# Patient Record
Sex: FEMALE | Race: WHITE | NOT HISPANIC OR LATINO | Employment: UNEMPLOYED | URBAN - METROPOLITAN AREA
[De-identification: names, ages, dates, MRNs, and addresses within clinical notes are randomized per-mention and may not be internally consistent; named-entity substitution may affect disease eponyms.]

---

## 2024-06-07 ENCOUNTER — DOCUMENTATION ONLY (OUTPATIENT)
Dept: CARDIOTHORACIC SURGERY | Facility: CLINIC | Age: 64
End: 2024-06-07

## 2024-06-07 NOTE — PROGRESS NOTES
Called Dr Huff's nurse, Mamie, and let her know that Dr Michaels is leaving [facility] and is not taking new patients. Asked if there was another provider that they would like to refer to. The is no other specific provider requested.    Julie Haase RN  [facility] Nurse Navigator [phone]

## 2024-06-17 ENCOUNTER — TELEPHONE (OUTPATIENT)
Dept: CARDIOTHORACIC SURGERY | Facility: CLINIC | Age: 64
End: 2024-06-17

## 2024-06-17 NOTE — TELEPHONE ENCOUNTER
Called to let pt know Dr Michaels was not taking new patients. Left voice mail with call back number.    Julie Haase RN  [facility] Nurse Navigator [phone]     no

## 2024-07-09 ENCOUNTER — PATIENT MESSAGE (OUTPATIENT)
Dept: OTOLARYNGOLOGY | Facility: CLINIC | Age: 64
End: 2024-07-09

## 2024-07-12 ENCOUNTER — TELEPHONE (OUTPATIENT)
Dept: NEUROSURGERY | Facility: CLINIC | Age: 64
End: 2024-07-12

## 2024-07-12 NOTE — TELEPHONE ENCOUNTER
Called patient in regards to referral placed to Neurosurgery/Jairo Giraldo MD. No updated imaging in chart. No answer. LVM to call [phone] for appointment scheduling

## 2024-07-12 NOTE — TELEPHONE ENCOUNTER
Called patient in regards to referral placed to Neurosurgery/Jairo Giraldo MD. No updated imaging in chart. No answer. LVM to call [phone] for appointment scheduling  
+fentanyl 2mch/ml; 14cc/hr; Ropivacaine 0.25% 8cc/Continuous Bupivicaine 0.1 percent

## 2024-07-29 PROBLEM — I10 HTN (HYPERTENSION): Status: ACTIVE | Noted: 2024-07-29

## 2024-07-29 PROBLEM — E66.01 SEVERE OBESITY (BMI >= 40): Status: ACTIVE | Noted: 2024-07-29

## 2024-07-29 PROBLEM — E78.5 HLD (HYPERLIPIDEMIA): Status: ACTIVE | Noted: 2024-07-29

## 2024-07-29 PROBLEM — I71.010 ASCENDING AORTIC DISSECTION: Status: ACTIVE | Noted: 2024-07-29

## 2024-08-22 ENCOUNTER — TELEPHONE (OUTPATIENT)
Dept: CARDIOTHORACIC SURGERY | Facility: CLINIC | Age: 64
End: 2024-08-22
Payer: MEDICARE

## 2024-08-22 NOTE — TELEPHONE ENCOUNTER
Called pt at both phone numbers listed for her; no answer.  Left VM at both phone numbers, requesting a call back to reschedule previously cancelled appts.      ----- Message from Bebeto Hahn sent at 8/22/2024  9:13 AM CDT -----  Good morning,     The appointment on 08/05/24 was canceled. Please contact the patient to reschedule.     Thank You,     [facility] Hahn  [facility]

## 2024-08-23 ENCOUNTER — TELEPHONE (OUTPATIENT)
Dept: CARDIOTHORACIC SURGERY | Facility: CLINIC | Age: 64
End: 2024-08-23
Payer: MEDICARE

## 2024-08-23 NOTE — TELEPHONE ENCOUNTER
Called and scheduled consult with Dr. Ellington for TAA on 9/23.  Pt informed that she will also be scheduled with Vascular Surgery on the same day for her thoracoabdominal aneurysm, which she verbalized understanding to.  Pt informed that an appt slip for 9/23 will be mailed to her, which she verbalized understanding to.

## 2024-09-20 ENCOUNTER — TELEPHONE (OUTPATIENT)
Dept: CARDIOTHORACIC SURGERY | Facility: CLINIC | Age: 64
End: 2024-09-20
Payer: MEDICARE

## 2024-09-20 NOTE — TELEPHONE ENCOUNTER
Attempted to contact pt to confirm 9/23 appt with Dr. Ellington. LVM with appt date, time, and location along with contact number.

## 2024-09-20 NOTE — PROGRESS NOTES
"Subjective:      Patient ID: Chelsie Odom is a 64 y.o. female.    Chief Complaint: No chief complaint on file.      HPI:  Chelsie Odom is a 64 y.o. female who presents for surgical evaluation of chronic dissection s/p Type A repair in 2004 with Dr. Almeida at [facility]. Medical conditions include hypertension, hyperlipidemia, chronic fatigue, obesity, Adverse anesthesia outcome (Daily memory function was affected for 6 months after anestheia 3/2015), PVD.  Patient had CTA chest in May that read "extensive aortic dissection which extends from the aortic annulus to the proximal bilateral common iliac arteries. The thoracic portion is stable when compared to the prior chest CT. No available images for comparison of the abdominal aorta component." Patient denies any family history of aneurysm. Reports that during her original operation it was Labor Day weekend and she collapsed in the yard. Reports it took some time for the doctors to figure out what was wrong with her. Says that she is not interested in having another operation that requires her to be cut open from her chest to her abdomen but reports leaving it alone probably is not a good idea either. Is very frustrated that she has been tossed around by so many physicians. Reports that for the past couple months she has had increasing fatigue and shortness of breath. It is hard for her to even carry her small purse now without having back pain. Bought a cane to help stabilize her back and uses it every now and then. Denies chest pain, dizziness, syncope, lower extremity swelling. Regarding her smoking she starts and stops frequently. Currently smoking. No prior strokes, seizures, blood clots, stents.       Current Outpatient Medications   Medication Instructions    aspirin (ECOTRIN) 81 mg, Oral, Daily    atenoloL (TENORMIN) 50 mg, Oral, Daily    diphenhydrAMINE (BENADRYL ALLERGY) 25 mg tablet Oral, Nightly PRN    etodolac (LODINE) 500 mg, Oral, 2 times " daily with meals    hydroCHLOROthiazide (HYDRODIURIL) 12.5 mg, Oral, Daily    HYDROcodone-acetaminophen (NORCO) 7.5-325 mg per tablet 1 tablet, Oral, Every 6 hours PRN    losartan (COZAAR) 50 mg, Oral, Daily    meloxicam (MOBIC) 15 mg, Oral, Daily, With food    multivitamin with minerals tablet 1 tablet, Oral, Daily    nebivoloL (BYSTOLIC) 5 MG Tab 1 tablet, Oral, Daily    oxycodone-acetaminophen (PERCOCET)  mg per tablet 1 tablet, Oral, Every 4-6 hours PRN    pravastatin (PRAVACHOL) 10 mg, Oral, Daily    ranitidine (ZANTAC) 25 mg, Oral, 2 times daily       Family and social history reviewed    Review of patient's allergies indicates:   Allergen Reactions    Keflex [cephalexin]      Gives pt yeast inf    Lodine [etodolac] Nausea And Vomiting    B-12 dots [cyanocobalamin (vitamin b-12)]      Makes pt violent    Mobic [meloxicam] Diarrhea     Dizziness / Headache/ Chest pain    Prozac [fluoxetine]      Past Medical History:   Diagnosis Date    Adverse anesthesia outcome     daily memory function was affected for 6 months after anestheia 3/2015    Depressed     Hypertension      Past Surgical History:   Procedure Laterality Date    ADENOIDECTOMY      AORTA SURGERY      Aortic Dissection 2004    APPENDECTOMY      CARPAL TUNNEL RELEASE Right     CHOLECYSTECTOMY      HERNIA REPAIR      OVARIAN CYST SURGERY      SHOULDER ARTHROSCOPY Left 2016    scope/ Open RCR/Arch decompression    SHOULDER SURGERY Right     TONSILLECTOMY       Family History       Problem Relation (Age of Onset)    Deep vein thrombosis Mother    Diabetes Brother    Hyperlipidemia Maternal Grandmother    Hypertension Maternal Grandmother, Father    Skin cancer Father          Social History     Socioeconomic History    Marital status: Other   Tobacco Use    Smoking status: Former     Current packs/day: 0.00     Types: Cigarettes     Start date: 3/1/1975     Quit date: 3/1/2015     Years since quittin.5    Smokeless tobacco: Never    Substance and Sexual Activity    Alcohol use: Yes     Comment: Social    Drug use: No    Sexual activity: Never       Current medications Reviewed    Review of Systems   Constitutional:  Positive for activity change.   HENT:  Negative for nosebleeds.    Eyes:  Negative for visual disturbance.   Respiratory:  Positive for shortness of breath.    Cardiovascular:  Negative for chest pain and leg swelling.   Gastrointestinal:  Negative for nausea.   Musculoskeletal:  Negative for gait problem.   Skin:  Negative for color change.   Neurological:  Negative for seizures and syncope.   Hematological:  Does not bruise/bleed easily.   Psychiatric/Behavioral:  Negative for sleep disturbance.      Objective:   Physical Exam  Constitutional:       General: She is not in acute distress.     Appearance: She is obese.   HENT:      Head: Normocephalic and atraumatic.   Eyes:      Pupils: Pupils are equal, round, and reactive to light.   Cardiovascular:      Rate and Rhythm: Normal rate.   Pulmonary:      Effort: Pulmonary effort is normal. No respiratory distress.   Musculoskeletal:         General: Normal range of motion.      Cervical back: Normal range of motion.   Skin:     Coloration: Skin is not pale.   Neurological:      General: No focal deficit present.      Mental Status: She is alert.   Psychiatric:         Mood and Affect: Mood normal.         Behavior: Behavior normal.         Diagnostic Results:   [facility] TTE 4/15/24  1. Normal left ventricular systolic function. LVEF of 60 to 65%.  2. LV diastolic function is indeterminate.    PHYSICIAN INTERPRETATION  LEFT VENTRICLE: Calculated left ventricular geometry shows concentric remodeling pattern. Normal left ventricular cavity size. Global left ventricular systolic function is normal. Left ventricular ejection fraction is 60 to 65%. The left ventricular diastolic function is of indeterminate grade.  LV HEMODYNAMICS: The LV stroke volume index is 43.5 ml/m².  LEFT ATRIUM: The  left atrium is normal.  RIGHT ATRIUM: The right atrium is normal qualitatively. Estimated right atrial pressure is 3 mmHg.  AORTIC VALVE: The native aortic valve is trileaflet. Normal estimated aortic valve area. No evidence of aortic stenosis. Trivial aortic valve regurgitation is seen.  AV mean P.7 mmHg  JOSSELIN (VTI):       4.52 cm²  JOSSELIN index (VTI): 2.34 cm²/m²  AoV Dim Index:   0.77    MITRAL VALVE: The mitral valve appears normal. No evidence of mitral valve stenosis. No evidence of mitral valve regurgitation.  TRICUSPID VALVE: Trivial tricuspid regurgitation is present.  PULMONIC VALVE: The pulmonic valve is not well seen. No evidence of pulmonic stenosis.  PULMONARY ARTERY: The estimated right ventricular systolic pressure is normal at 21 mmHg.     Assessment:   S/P Type A dissection repair with residual dissection   Plan:   Patient to see Dr. Munson following our visit. 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 Not a surgical candidate for surgery due to multiple comorbid conditions and active smoking. I have talked top Dr Munson, who independently has evaluated the patient and agrees with that plan.

## 2024-09-23 ENCOUNTER — TELEPHONE (OUTPATIENT)
Dept: CARDIOTHORACIC SURGERY | Facility: CLINIC | Age: 64
End: 2024-09-23
Payer: MEDICARE

## 2024-09-23 ENCOUNTER — OFFICE VISIT (OUTPATIENT)
Dept: CARDIOTHORACIC SURGERY | Facility: CLINIC | Age: 64
End: 2024-09-23
Payer: MEDICARE

## 2024-09-23 ENCOUNTER — INITIAL CONSULT (OUTPATIENT)
Dept: VASCULAR SURGERY | Facility: CLINIC | Age: 64
End: 2024-09-23
Attending: SURGERY
Payer: MEDICARE

## 2024-09-23 VITALS
BODY MASS INDEX: 40.45 KG/M2 | OXYGEN SATURATION: 94 % | WEIGHT: 214.06 LBS | DIASTOLIC BLOOD PRESSURE: 66 MMHG | HEART RATE: 77 BPM | SYSTOLIC BLOOD PRESSURE: 120 MMHG

## 2024-09-23 VITALS
HEIGHT: 62 IN | BODY MASS INDEX: 39.2 KG/M2 | TEMPERATURE: 98 F | DIASTOLIC BLOOD PRESSURE: 67 MMHG | WEIGHT: 213 LBS | SYSTOLIC BLOOD PRESSURE: 111 MMHG | HEART RATE: 64 BPM

## 2024-09-23 DIAGNOSIS — Z72.0 TOBACCO USE: ICD-10-CM

## 2024-09-23 DIAGNOSIS — I71.019 CHRONIC DISSECTION OF THORACIC AORTA: ICD-10-CM

## 2024-09-23 DIAGNOSIS — I71.019 DISSECTING AORTIC ANEURYSM, THORACIC: ICD-10-CM

## 2024-09-23 DIAGNOSIS — I71.00 DISSECTION OF AORTA, UNSPECIFIED PORTION OF AORTA: Primary | ICD-10-CM

## 2024-09-23 DIAGNOSIS — I71.010 ASCENDING AORTIC DISSECTION: ICD-10-CM

## 2024-09-23 DIAGNOSIS — I71.23 ANEURYSM OF DESCENDING THORACIC AORTA WITHOUT RUPTURE: Primary | ICD-10-CM

## 2024-09-23 PROCEDURE — 99999 PR PBB SHADOW E&M-EST. PATIENT-LVL III: CPT | Mod: PBBFAC,,, | Performed by: THORACIC SURGERY (CARDIOTHORACIC VASCULAR SURGERY)

## 2024-09-23 PROCEDURE — 1160F RVW MEDS BY RX/DR IN RCRD: CPT | Mod: CPTII,S$GLB,, | Performed by: THORACIC SURGERY (CARDIOTHORACIC VASCULAR SURGERY)

## 2024-09-23 PROCEDURE — 99999 PR PBB SHADOW E&M-EST. PATIENT-LVL III: CPT | Mod: PBBFAC,,, | Performed by: SURGERY

## 2024-09-23 PROCEDURE — 3074F SYST BP LT 130 MM HG: CPT | Mod: CPTII,S$GLB,, | Performed by: THORACIC SURGERY (CARDIOTHORACIC VASCULAR SURGERY)

## 2024-09-23 PROCEDURE — 99203 OFFICE O/P NEW LOW 30 MIN: CPT | Mod: S$GLB,,, | Performed by: SURGERY

## 2024-09-23 PROCEDURE — 99205 OFFICE O/P NEW HI 60 MIN: CPT | Mod: S$GLB,,, | Performed by: THORACIC SURGERY (CARDIOTHORACIC VASCULAR SURGERY)

## 2024-09-23 PROCEDURE — 3078F DIAST BP <80 MM HG: CPT | Mod: CPTII,S$GLB,, | Performed by: THORACIC SURGERY (CARDIOTHORACIC VASCULAR SURGERY)

## 2024-09-23 PROCEDURE — 4010F ACE/ARB THERAPY RXD/TAKEN: CPT | Mod: CPTII,S$GLB,, | Performed by: THORACIC SURGERY (CARDIOTHORACIC VASCULAR SURGERY)

## 2024-09-23 PROCEDURE — 3008F BODY MASS INDEX DOCD: CPT | Mod: CPTII,S$GLB,, | Performed by: THORACIC SURGERY (CARDIOTHORACIC VASCULAR SURGERY)

## 2024-09-23 PROCEDURE — 1159F MED LIST DOCD IN RCRD: CPT | Mod: CPTII,S$GLB,, | Performed by: THORACIC SURGERY (CARDIOTHORACIC VASCULAR SURGERY)

## 2024-09-23 RX ORDER — DIPHENHYDRAMINE HCL 25 MG
TABLET ORAL NIGHTLY PRN
COMMUNITY

## 2024-09-23 RX ORDER — PRAVASTATIN SODIUM 10 MG/1
10 TABLET ORAL DAILY
COMMUNITY

## 2024-09-23 RX ORDER — NEBIVOLOL 5 MG/1
1 TABLET ORAL DAILY
COMMUNITY
Start: 2024-08-22

## 2024-09-23 NOTE — TELEPHONE ENCOUNTER
"Called pt RE: appt with Dr. Ellington this morning and was informed that pt "is at the , trying to check in now".  "

## 2024-09-23 NOTE — PROGRESS NOTES
VASCULAR SURGERY NOTE    Patient ID: Chelsie Odom is a 64 y.o. female.    I. HISTORY     Chief Complaint: aortic aneurysm    HPI: Chelsie Odom is a 64 y.o. female who is here today for new patient initial appointment.  She has a greater than 7 cm aneurysm of her proximal descending thoracic aorta.  She has a history of type a dissection with prior short ascending tube graft repaired across the late by outside surgeon.  She was referred by Dr. Huff for evaluation of her aortic aneurysm.  She is a chronic smoker and continues to smoke.  She arrives to clinic today in a wheelchair.     ALLERGIES: reviewed    MEDICATIONS: reviewed in EMR    Past Medical History:   Diagnosis Date    Adverse anesthesia outcome     daily memory function was affected for 6 months after anestheia 3/2015    Depressed     Hypertension         Past Surgical History:   Procedure Laterality Date    ADENOIDECTOMY      AORTA SURGERY      Aortic Dissection 5/2004    APPENDECTOMY      CARPAL TUNNEL RELEASE Right     CHOLECYSTECTOMY      HERNIA REPAIR      OVARIAN CYST SURGERY      SHOULDER ARTHROSCOPY Left 07/05/2016    scope/ Open RCR/Arch decompression    SHOULDER SURGERY Right     TONSILLECTOMY         Social History     Occupational History    Not on file   Tobacco Use    Smoking status: Current smoker     Current packs/day: 1/4-1/2 ppd     Types: Cigarettes     Start date: 3/1/1975                Smokeless tobacco: Never   Substance and Sexual Activity    Alcohol use: Yes     Comment: Social    Drug use: No    Sexual activity: Never         Review of Systems   Constitutional: Negative for weight loss.   HENT:  Negative for ear pain and nosebleeds.    Eyes:  Negative for discharge and pain.   Cardiovascular:  Negative for chest pain and palpitations.   Respiratory:  Negative for cough, shortness of breath and wheezing.    Endocrine: Negative for cold intolerance, heat intolerance and polyphagia.   Hematologic/Lymphatic: Negative for  adenopathy. Does not bruise/bleed easily.   Skin:  Negative for itching and rash.   Musculoskeletal:  Negative for joint swelling and muscle cramps.   Gastrointestinal:  Negative for abdominal pain, diarrhea, nausea and vomiting.   Genitourinary:  Negative for dysuria and flank pain.   Neurological:  Negative for numbness and seizures.         II. PHYSICAL EXAM     Physical Exam  Constitutional:       General: She is not in acute distress.     Appearance: Normal appearance. She is normal weight. She is not ill-appearing or diaphoretic.      Comments: Arrives in wheelchair   HENT:      Head: Normocephalic and atraumatic.   Eyes:      General: No scleral icterus.        Right eye: No discharge.         Left eye: No discharge.      Extraocular Movements: Extraocular movements intact.      Conjunctiva/sclera: Conjunctivae normal.   Cardiovascular:      Rate and Rhythm: Normal rate and regular rhythm.      Pulses: Normal pulses.   Pulmonary:      Effort: Pulmonary effort is normal. No respiratory distress.   Musculoskeletal:         General: Normal range of motion.   Skin:     General: Skin is warm and dry.   Neurological:      General: No focal deficit present.      Mental Status: She is alert and oriented to person, place, and time.   Psychiatric:         Mood and Affect: Mood normal.         Behavior: Behavior normal.            III. ASSESSMENT & PLAN (MEDICAL DECISION MAKING)       Imaging Results: (I have personally reviewed all images and provided interpretation below)  CTA chest/abd/pelvis 5/27/24:  horrifically calcified dissected aortic arch and descending thoracic aorta and abdominal aorta.   There is aneurysmal dilation of the proximal descending thoracic aorta greater than 7 cm.  There is residual dissection through all of the arch vessels.  There was residual dissection through the entire arch.  There may be a pseudoaneurysm in the aortic arch .  Her dissection extends down through the abdominal aorta  and  terminates in the proximal common iliac arteries bilaterally.  Celiac artery is perfused via the false lumen.  SMA and right renal are perfused via the true.  Left renal is perfused via the false lumen.         Assessment/Diagnosis and Plan:    1. 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 Chronic dissection of thoracic aorta    3. Dissecting aortic aneurysm, thoracic        64 y.o. female with chronic residual dissection after surgical repair of acute type a dissection with short ascending graft.  She has residual dissection through all of her arch vessels.  Her innominate is severely calcified.  There was no endovascular option for surgical repair even with extra-anatomic arch debranching, there is no landing zone for any endograft since she had such a short ascending graft put in place.  I do not think she is a candidate for open arch reconstruction based on her severe calcification, persistent arch dissection, and severely deconditioned status.  I explained that she has a proximally 40% annual risk of rupture at her current size but unfortunately there is no viable option for surgical repair for her. I explained that if she were to rupture that she would die and there would be no surgical option offered in emergent setting either. The patient expressed understanding and was thankful for the information.    NATTY Munson II, MD, [facility]  Vascular Surgery  [facility]